# Patient Record
Sex: FEMALE | Race: WHITE | NOT HISPANIC OR LATINO | URBAN - METROPOLITAN AREA
[De-identification: names, ages, dates, MRNs, and addresses within clinical notes are randomized per-mention and may not be internally consistent; named-entity substitution may affect disease eponyms.]

---

## 2020-08-20 ENCOUNTER — COMMUNICATION - HEALTHEAST (OUTPATIENT)
Dept: SCHEDULING | Facility: CLINIC | Age: 65
End: 2020-08-20

## 2020-10-19 ENCOUNTER — OFFICE VISIT - HEALTHEAST (OUTPATIENT)
Dept: FAMILY MEDICINE | Facility: CLINIC | Age: 65
End: 2020-10-19

## 2020-10-19 ENCOUNTER — COMMUNICATION - HEALTHEAST (OUTPATIENT)
Dept: FAMILY MEDICINE | Facility: CLINIC | Age: 65
End: 2020-10-19

## 2020-10-19 ENCOUNTER — AMBULATORY - HEALTHEAST (OUTPATIENT)
Dept: LAB | Facility: CLINIC | Age: 65
End: 2020-10-19

## 2020-10-19 ENCOUNTER — COMMUNICATION - HEALTHEAST (OUTPATIENT)
Dept: SCHEDULING | Facility: CLINIC | Age: 65
End: 2020-10-19

## 2020-10-19 DIAGNOSIS — E11.9 TYPE 2 DIABETES MELLITUS WITHOUT COMPLICATION, WITHOUT LONG-TERM CURRENT USE OF INSULIN (H): ICD-10-CM

## 2020-10-19 DIAGNOSIS — E03.9 ACQUIRED HYPOTHYROIDISM: ICD-10-CM

## 2020-10-19 DIAGNOSIS — Z86.16 HISTORY OF 2019 NOVEL CORONAVIRUS DISEASE (COVID-19): ICD-10-CM

## 2020-10-19 DIAGNOSIS — E78.5 HYPERLIPIDEMIA, UNSPECIFIED HYPERLIPIDEMIA TYPE: ICD-10-CM

## 2020-10-19 DIAGNOSIS — F41.1 GAD (GENERALIZED ANXIETY DISORDER): ICD-10-CM

## 2020-10-19 DIAGNOSIS — D68.51 FACTOR V LEIDEN MUTATION (H): ICD-10-CM

## 2020-10-19 LAB
ALBUMIN SERPL-MCNC: 4.5 G/DL (ref 3.5–5)
ALP SERPL-CCNC: 69 U/L (ref 45–120)
ALT SERPL W P-5'-P-CCNC: 32 U/L (ref 0–45)
ANION GAP SERPL CALCULATED.3IONS-SCNC: 13 MMOL/L (ref 5–18)
AST SERPL W P-5'-P-CCNC: 23 U/L (ref 0–40)
BILIRUB SERPL-MCNC: 0.9 MG/DL (ref 0–1)
BUN SERPL-MCNC: 17 MG/DL (ref 8–22)
CALCIUM SERPL-MCNC: 10.1 MG/DL (ref 8.5–10.5)
CHLORIDE BLD-SCNC: 103 MMOL/L (ref 98–107)
CHOLEST SERPL-MCNC: 183 MG/DL
CO2 SERPL-SCNC: 25 MMOL/L (ref 22–31)
CREAT SERPL-MCNC: 0.78 MG/DL (ref 0.6–1.1)
CREAT UR-MCNC: 94 MG/DL
FASTING STATUS PATIENT QL REPORTED: YES
GFR SERPL CREATININE-BSD FRML MDRD: >60 ML/MIN/1.73M2
GLUCOSE BLD-MCNC: 121 MG/DL (ref 70–125)
HBA1C MFR BLD: 5.8 %
HDLC SERPL-MCNC: 40 MG/DL
LDLC SERPL CALC-MCNC: 108 MG/DL
MICROALBUMIN UR-MCNC: 0.64 MG/DL (ref 0–1.99)
MICROALBUMIN/CREAT UR: 6.8 MG/G
POTASSIUM BLD-SCNC: 5.6 MMOL/L (ref 3.5–5)
PROT SERPL-MCNC: 7.3 G/DL (ref 6–8)
SODIUM SERPL-SCNC: 141 MMOL/L (ref 136–145)
T4 FREE SERPL-MCNC: 1.3 NG/DL (ref 0.7–1.8)
TRIGL SERPL-MCNC: 176 MG/DL
TSH SERPL DL<=0.005 MIU/L-ACNC: 0.33 UIU/ML (ref 0.3–5)

## 2020-10-19 RX ORDER — ASPIRIN 81 MG/1
81 TABLET, CHEWABLE ORAL
Status: SHIPPED | COMMUNITY
Start: 2020-10-19

## 2020-10-19 RX ORDER — TACROLIMUS 5 MG/1
CAPSULE ORAL
Status: SHIPPED | COMMUNITY
Start: 2019-06-11

## 2020-10-19 RX ORDER — CITALOPRAM HYDROBROMIDE 20 MG/1
20 TABLET ORAL DAILY
Qty: 90 TABLET | Refills: 3 | Status: SHIPPED | OUTPATIENT
Start: 2020-10-19 | End: 2021-12-13

## 2020-10-19 RX ORDER — AMOXICILLIN 500 MG/1
CAPSULE ORAL
Status: SHIPPED | COMMUNITY
Start: 2020-01-27

## 2020-10-19 RX ORDER — ROSUVASTATIN CALCIUM 20 MG/1
20 TABLET, COATED ORAL DAILY
Qty: 90 TABLET | Refills: 3 | Status: SHIPPED | OUTPATIENT
Start: 2020-10-19

## 2020-10-19 RX ORDER — FLASH GLUCOSE SENSOR
1 KIT MISCELLANEOUS
Qty: 6 KIT | Refills: 3 | Status: SHIPPED | OUTPATIENT
Start: 2020-10-19 | End: 2021-08-28

## 2020-10-19 RX ORDER — BLOOD SUGAR DIAGNOSTIC
STRIP MISCELLANEOUS
Status: SHIPPED | COMMUNITY
Start: 2020-04-01

## 2020-10-19 RX ORDER — KETOCONAZOLE 20 MG/G
CREAM TOPICAL
Status: SHIPPED | COMMUNITY
Start: 2019-06-10

## 2020-10-19 RX ORDER — TOPIRAMATE 25 MG/1
CAPSULE, EXTENDED RELEASE ORAL
Status: SHIPPED | COMMUNITY
Start: 2020-05-21

## 2020-10-19 RX ORDER — LISINOPRIL 10 MG/1
10 TABLET ORAL DAILY
Status: SHIPPED | COMMUNITY
Start: 2020-09-21

## 2020-10-19 RX ORDER — AZELAIC ACID 0.15 G/G
GEL TOPICAL
Status: SHIPPED | COMMUNITY
Start: 2019-06-10

## 2020-10-19 RX ORDER — PHENTERMINE HYDROCHLORIDE 37.5 MG/1
37.5 TABLET ORAL
Status: SHIPPED | COMMUNITY
Start: 2020-10-19

## 2020-10-19 RX ORDER — LEVOTHYROXINE SODIUM 88 MCG
88 TABLET ORAL
Qty: 90 TABLET | Refills: 3 | Status: SHIPPED | OUTPATIENT
Start: 2020-10-19

## 2020-10-19 ASSESSMENT — ANXIETY QUESTIONNAIRES
IF YOU CHECKED OFF ANY PROBLEMS ON THIS QUESTIONNAIRE, HOW DIFFICULT HAVE THESE PROBLEMS MADE IT FOR YOU TO DO YOUR WORK, TAKE CARE OF THINGS AT HOME, OR GET ALONG WITH OTHER PEOPLE: SOMEWHAT DIFFICULT
1. FEELING NERVOUS, ANXIOUS, OR ON EDGE: SEVERAL DAYS
GAD7 TOTAL SCORE: 8
7. FEELING AFRAID AS IF SOMETHING AWFUL MIGHT HAPPEN: SEVERAL DAYS
4. TROUBLE RELAXING: SEVERAL DAYS
2. NOT BEING ABLE TO STOP OR CONTROL WORRYING: SEVERAL DAYS
5. BEING SO RESTLESS THAT IT IS HARD TO SIT STILL: SEVERAL DAYS
3. WORRYING TOO MUCH ABOUT DIFFERENT THINGS: SEVERAL DAYS
6. BECOMING EASILY ANNOYED OR IRRITABLE: MORE THAN HALF THE DAYS

## 2020-10-19 ASSESSMENT — PATIENT HEALTH QUESTIONNAIRE - PHQ9: SUM OF ALL RESPONSES TO PHQ QUESTIONS 1-9: 4

## 2020-10-21 ENCOUNTER — COMMUNICATION - HEALTHEAST (OUTPATIENT)
Dept: FAMILY MEDICINE | Facility: CLINIC | Age: 65
End: 2020-10-21

## 2020-10-23 ENCOUNTER — COMMUNICATION - HEALTHEAST (OUTPATIENT)
Dept: FAMILY MEDICINE | Facility: CLINIC | Age: 65
End: 2020-10-23

## 2020-10-23 DIAGNOSIS — E87.5 HYPERKALEMIA: ICD-10-CM

## 2020-10-23 DIAGNOSIS — E11.9 TYPE 2 DIABETES MELLITUS WITHOUT COMPLICATION, WITHOUT LONG-TERM CURRENT USE OF INSULIN (H): ICD-10-CM

## 2020-10-23 RX ORDER — METFORMIN HCL 500 MG
500 TABLET, EXTENDED RELEASE 24 HR ORAL
Qty: 90 TABLET | Refills: 3 | Status: SHIPPED | OUTPATIENT
Start: 2020-10-23 | End: 2021-11-10

## 2020-10-29 ENCOUNTER — AMBULATORY - HEALTHEAST (OUTPATIENT)
Dept: LAB | Facility: CLINIC | Age: 65
End: 2020-10-29

## 2020-10-29 DIAGNOSIS — E87.5 HYPERKALEMIA: ICD-10-CM

## 2020-10-29 LAB — POTASSIUM BLD-SCNC: 4.9 MMOL/L (ref 3.5–5)

## 2021-05-27 ASSESSMENT — PATIENT HEALTH QUESTIONNAIRE - PHQ9: SUM OF ALL RESPONSES TO PHQ QUESTIONS 1-9: 4

## 2021-05-28 ASSESSMENT — ANXIETY QUESTIONNAIRES: GAD7 TOTAL SCORE: 8

## 2021-06-10 NOTE — TELEPHONE ENCOUNTER
Coronavirus (COVID-19) Notification    Reason for call  Notify of POSITIVE  COVID-19 lab result, assess symptoms,  review [facility] recommendations    Lab Result   Lab test for 2019-nCoV rRt-PCR or SARS-COV-2 PCR  Oropharyngeal AND/OR nasopharyngeal swabs were POSITIVE for 2019-nCoV RNA [OR] SARS-COV-2 RNA (COVID-19) RNA     We have been unable to reach Patient by phone at this time to notify of their Positive COVID-19 result.  Left voicemail message requesting a call back to [phone] [facility] for results.        POSITIVE COVID-19 Letter sent.    [Name]  Clive Hahn RN  [employer] [facility]  COVID19 Results Team RN  Ph# [phone]         week(s)/2 weeks

## 2021-06-12 NOTE — TELEPHONE ENCOUNTER
Central PA team  [phone]  Pool: HE PA MED ([id number])          PA has been initiated.       PA form completed and faxed insurance via Cover My Meds     Key:  [id number]     Medication:  FreeStyle Neetu 14 Day Sensor    Insurance:  [employer]        Response will be received via fax and may take up to 5-10 business days depending on plan

## 2021-06-12 NOTE — PROGRESS NOTES
"Veronica Dumont is a 65 y.o. female who is being evaluated via a billable video visit.      The patient has been notified of following:     \"This video visit will be conducted via a call between you and your physician/provider. We have found that certain health care needs can be provided without the need for an in-person physical exam.  This service lets us provide the care you need with a video conversation.  If a prescription is necessary we can send it directly to your pharmacy.  If lab work is needed we can place an order for that and you can then stop by our lab to have the test done at a later time.    Video visits are billed at different rates depending on your insurance coverage. Please reach out to your insurance provider with any questions.    If during the course of the call the physician/provider feels a video visit is not appropriate, you will not be charged for this service.\"    Patient has given verbal consent to a Video visit? Yes  How would you like to obtain your AVS? AVS Preference: MyChart.  If dropped by the video visit, the video invitation should be sent to: Other e-mail: MY CHART   Will anyone else be joining your video visit? No        Video Start Time: 7:35    Additional provider notes:   ASSESSMENT/PLAN:       1. Type 2 diabetes mellitus without complication, without long-term current use of insulin (H)    - Glycosylated Hemoglobin A1c; Future, A1c 5.8  May be able to reduce her Metformin dose to 500 mg daily and if we do that it would be either best to take the medicine first thing in the morning with breakfast or to switch it to the extended release Metformin 500 mg daily    - Comprehensive Metabolic Panel; Future, blood sugar was 121 and potassium was 5.6  Elevated potassium could be related to lisinopril or also some hemolysis on the sample but that was not mentioned in the report.  May want to consider repeating a potassium level  Foods that are high in potassium would include " bananas, citrus fruits, apricots and other dried fruits, spinach, sweet potatoes and may want to limit these foods.    - Microalbumin, Random Urine; Future, microalbumin to creatinine ratio normal at 6.8    - FREESTYLE CLARA 14 DAY SENSOR Kit; Apply 1 Units topically every 14 (fourteen) days. Use as directed change sensor every 14 days  Dispense: 6 kit; Refill: 3  Part D of Medicare declined pain for the clara test kits and will see if it will be approved through part B of Medicare and then may need to send it to the [facility] mail out pharmacy would be more likely to be able to submit the charges to part B of Medicare.    2. Hyperlipidemia, unspecified hyperlipidemia type    - Lipid [place] FASTING; Future, 183/176/40/180    - rosuvastatin (CRESTOR) 20 MG tablet; Take 1 tablet (20 mg total) by mouth daily.  Dispense: 90 tablet; Refill: 3    - SYNTHROID 88 mcg tablet; Take 1 tablet (88 mcg total) by mouth daily before breakfast.  Dispense: 90 tablet; Refill: 3    3. Acquired hypothyroidism    - T4, Free; Future, 1.3 which is normal    - Thyroid Stimulating Hormone (TSH); Future, 0.33 which is in the normal range    Patient will continue on Synthroid 88 mcg daily    4. LINDA (generalized anxiety disorder)    - citalopram (CELEXA) 20 MG tablet; Take 1 tablet (20 mg total) by mouth daily.  Dispense: 90 tablet; Refill: 3  The patient feels that her citalopram is helping her symptoms and that she is not having any side effects and desires to continue on the same dose.    5. Factor V Leiden mutation (H)  This could be an issue in the future particularly if at increased risk for venous stasis and thrombosis.  Precautions important.    6. History of 2019 novel coronavirus disease (COVID-19)  Patient seems to be completely recovered and her  currently is home and he had a much more severe illness with hospitalization and still requiring oxygen.    MyChart message with test results  Would be good in the near future to  have a face-to-face visit so that her blood pressure could be checked.  According to the patient her home blood pressures have been running in the 130s over 70s.               Asaf Saldaña MD      PROGRESS NOTE   10/22/2020    SUBJECTIVE:  Veronica Dumont is a 65 y.o. female  who presents for   Chief Complaint   Patient presents with     Establish Care     Medication Refill     Diabetes     New patient seen today per video visit for med check.  Patient typically gets her care in [place] but has been here in [place] for an extended period of time because of her 's kidney transplant.  Family also has a home in [facility].    1. Type 2 diabetes mellitus without complication, without long-term current use of insulin (H)  The patient has a diagnosis of type 2 diabetes and has been treated with Metformin 500 mg twice a day.  The patient's last A1c was greater than a year ago and was 6.7.  The patient has noticed some low blood sugars.  So she does not have to poke her finger so much she would like to get the deneen continuous blood glucose monitoring renewed so she can have additional kits to use.  Each kit the last 14 days.    2. Hyperlipidemia, unspecified hyperlipidemia type  The patient is on rosuvastatin 20 mg daily for hyperlipidemia    3. Acquired hypothyroidism  The patient is on levothyroxine 88 mcg daily and is in need of having her thyroid function checked    4. LINDA (generalized anxiety disorder)  Patient has had a history of anxiety particularly bothersome with her current situation both with her 's health problems and her recently having COVID-19.  Also being away from there home in [place] has been challenging.    5. Factor V Leiden mutation (H)  This condition was noted in her medical history but she has not had to be on any blood thinners.    6. History of 2019 novel coronavirus disease (COVID-19)  Positive diagnosis August 2020 with a relatively mild case not requiring  hospitalization.  Patient feels that she has completely recovered from this illness.    Patient Active Problem List   Diagnosis     Type 2 diabetes mellitus without complication, without long-term current use of insulin (H)     Hyperlipidemia, unspecified hyperlipidemia type     Acquired hypothyroidism     LINDA (generalized anxiety disorder)     Factor V Leiden mutation (H)     History of 2019 novel coronavirus disease (COVID-19)     History of cervical dysplasia       Current Outpatient Medications   Medication Sig Dispense Refill     ACCU-CHEK SANGITA PLUS TEST STRP strips USE TO TEST BLOOD SUGAR ONCE DAILY       amoxicillin (AMOXIL) 500 MG capsule TAKE 4 CAPSUELS BY MOUTH 1 HOUR BEFORE DENTAL APPOINMENT       aspirin 81 mg chewable tablet Chew 81 mg.       azelaic acid (FINACEA) 15 % gel Apply topically.       citalopram (CELEXA) 20 MG tablet Take 1 tablet (20 mg total) by mouth daily. 90 tablet 3     FREESTYLE CLARA 14 DAY SENSOR Kit Apply 1 Units topically every 14 (fourteen) days. Use as directed change sensor every 14 days 6 kit 3     ketoconazole (NIZORAL) 2 % cream Apply topically.       lisinopriL (PRINIVIL,ZESTRIL) 10 MG tablet Take 10 mg by mouth daily.       metFORMIN (GLUCOPHAGE) 500 MG tablet TAKE 1 TABLET DAILY WITH A MEAL X 1 WEEK, THEN 1 TAB IN THE MORNING & 1 IN THE EVENING WITH A MEAL       phentermine (ADIPEX-P) 37.5 mg tablet Take 37.5 mg by mouth daily before breakfast.       rosuvastatin (CRESTOR) 20 MG tablet Take 1 tablet (20 mg total) by mouth daily. 90 tablet 3     sulfacetamide sodium-sulfur 10-5 % (w/w) Clsr Apply to face one to two times daily       SYNTHROID 88 mcg tablet Take 1 tablet (88 mcg total) by mouth daily before breakfast. 90 tablet 3     tacrolimus (PROGRAF) 5 MG capsule Apply topically.       topiramate (QUDEXY XR) 25 mg 24 hour capsule TAKE 1 CAPSULE BY MOUTH ONCE DAILY AT BEDTIME       No current facility-administered medications for this visit.        Social History      Tobacco Use   Smoking Status Former Smoker   Smokeless Tobacco Never Used   Tobacco Comment    QUIT 35 YEARS AGO            OBJECTIVE:        Recent Results (from the past 240 hour(s))   Glycosylated Hemoglobin A1c   Result Value Ref Range    Hemoglobin A1c 5.8 (H) <=5.6 %   Lipid [place] FASTING   Result Value Ref Range    Cholesterol 183 <=199 mg/dL    Triglycerides 176 (H) <=149 mg/dL    HDL Cholesterol 40 (L) >=50 mg/dL    LDL Calculated 108 <=129 mg/dL    Patient Fasting > 8hrs? Yes    Comprehensive Metabolic Panel   Result Value Ref Range    Sodium 141 136 - 145 mmol/L    Potassium 5.6 (H) 3.5 - 5.0 mmol/L    Chloride 103 98 - 107 mmol/L    CO2 25 22 - 31 mmol/L    Anion Gap, Calculation 13 5 - 18 mmol/L    Glucose 121 70 - 125 mg/dL    BUN 17 8 - 22 mg/dL    Creatinine 0.78 0.60 - 1.10 mg/dL    GFR MDRD Af Amer >60 >60 mL/min/1.73m2    GFR MDRD Non Af Amer >60 >60 mL/min/1.73m2    Bilirubin, Total 0.9 0.0 - 1.0 mg/dL    Calcium 10.1 8.5 - 10.5 mg/dL    Protein, Total 7.3 6.0 - 8.0 g/dL    Albumin 4.5 3.5 - 5.0 g/dL    Alkaline Phosphatase 69 45 - 120 U/L    AST 23 0 - 40 U/L    ALT 32 0 - 45 U/L   T4, Free   Result Value Ref Range    Free T4 1.3 0.7 - 1.8 ng/dL   Thyroid Stimulating Hormone (TSH)   Result Value Ref Range    TSH 0.33 0.30 - 5.00 uIU/mL   Microalbumin, Random Urine   Result Value Ref Range    Microalbumin, Random Urine 0.64 0.00 - 1.99 mg/dL    Creatinine, Urine 94.0 mg/dL    Microalbumin/Creatinine Ratio Random Urine 6.8 <=19.9 mg/g       There were no vitals filed for this visit.  No data recorded        Physical Exam:    GENERAL: Healthy appearing 65-year-old female, alert and no distress  EYES: Eyes grossly normal to inspection. No discharge or erythema, or obvious scleral/conjunctival abnormalities.  RESP: No audible wheeze, cough, or visible cyanosis.  No visible retractions or increased work of breathing.    NEURO: Cranial nerves grossly intact. Mentation and speech appropriate for  age.  PSYCH: Mentation appears normal, affect normal/bright, judgement and insight intact, normal speech and appearance well-groomed      Video-Visit Details    Type of service:  Video Visit    Video End Time (time video stopped): 8:04  Originating Location (pt. Location): Home    Distant Location (provider location):  [facility]     Platform used for Video Visit: Jorge Saldaña MD

## 2021-06-12 NOTE — PROGRESS NOTES
Veronica,  Your potassium has improved and now is normal at 4.9.  So that is good and no worries. Keep up with current medications.  Please let me know if questions.    Dr. Saldaña

## 2021-06-12 NOTE — PROGRESS NOTES
Veronica,  It was a pleasure to visit with you this week with the video visit.  Your test results are back and I would like to discuss those results.  The urine test looks at your kidney function and your numbers look normal.  We check this on a yearly basis because of your diabetes.    The lipid cascade shows that your triglycerides are mildly elevated at 176 and would like to see below 150.  The HDL cholesterol is the good cholesterol and that should be in the 50s.  The LDL is your bad cholesterol and ideally would like to see that below 100.  If you were not taking the rosuvastatin these values would be worse.  We certainly need to continue on this medication and to lower the triglycerides try to limit carbohydrates, baked goods and sweets.    The metabolic panel showed that your blood glucose was 121.  The potassium was high at 5.6.  Taking lisinopril can raise the potassium but not usually that much.  Foods that raise potassium would include bananas, citrus fruit, dried fruits such as apricots, raisins and dates.  Also spinach, and sweet potatoes are high in potassium.  If you are taking any type of potassium supplement I certainly would stop that.I would try to not eat a regular diet of the above foods but do not need to be eliminated completely. It would be good to recheck your potassium at some point in the next few weeks.  Your liver and kidney function tests are normal.    The thyroid tests included the free T4 and TSH which are both in the normal range.  You should continue your current strength of 88 mcg on the thyroid replacement.    The hemoglobin A1c was 5.8 which is nice and low.  I think you mentioned that your blood sugars seem to be running a bit low.  We may be able to decrease your Metformin to 500 mg daily.  The current Metformin that you are on is the 12-hour Metformin and there is an extended release Metformin that works for 24 hours that may be worthwhile to switch to if taking the Metformin  just once a day.    I had sent another [employer] message yesterday about the deneen continuous blood sugar monitoring device.  Our prior authorization team did get back to me today and stated that the [facility] does not have a way to process the needed billing through Medicare part B.  If you would like I would be more than happy to send the prescription to the [facility] mail out pharmacy and see if they would be able to help get approval for the deneen system to monitor your blood sugars.    Please respond back to me on feedback and questions that you might have about my message.    Best regards,  Dr. Saldaña

## 2021-06-12 NOTE — TELEPHONE ENCOUNTER
Prior Authorization Request  Who s requesting:  Pharmacy  Pharmacy Name and Location: [facility] #[id number]  Medication Name: FREESTYLE CLARA 14 DAY SENSOR Kit  Insurance Plan: [facility] [employer]    Insurance Member ID Number:  [id number]  CoverMyMeds Key: N/A  Informed patient that prior authorizations can take up to 10 business days for response:   NA  Okay to leave a detailed message: No

## 2021-06-12 NOTE — TELEPHONE ENCOUNTER
I called [facility] and they have not been able to successfully get the FreeStyle Neetu's to process through Medicare Part B. The recommendation of sending them to the [facility] would be the best option as they have a team that would work on the request through the patient's Medicare Medical Policy.

## 2021-06-16 PROBLEM — E11.9 TYPE 2 DIABETES MELLITUS WITHOUT COMPLICATION, WITHOUT LONG-TERM CURRENT USE OF INSULIN (H): Status: ACTIVE | Noted: 2020-10-22

## 2021-06-16 PROBLEM — E78.5 HYPERLIPIDEMIA, UNSPECIFIED HYPERLIPIDEMIA TYPE: Status: ACTIVE | Noted: 2020-10-22

## 2021-06-16 PROBLEM — D68.51 FACTOR V LEIDEN MUTATION (H): Status: ACTIVE | Noted: 2020-10-22

## 2021-06-16 PROBLEM — Z86.16 HISTORY OF 2019 NOVEL CORONAVIRUS DISEASE (COVID-19): Status: ACTIVE | Noted: 2020-10-22

## 2021-06-16 PROBLEM — E03.9 ACQUIRED HYPOTHYROIDISM: Status: ACTIVE | Noted: 2020-10-22

## 2021-06-16 PROBLEM — Z87.410 HISTORY OF CERVICAL DYSPLASIA: Status: ACTIVE | Noted: 2020-10-22

## 2021-06-16 PROBLEM — F41.1 GAD (GENERALIZED ANXIETY DISORDER): Status: ACTIVE | Noted: 2020-10-22

## 2021-06-17 NOTE — TELEPHONE ENCOUNTER
Telephone Encounter by Stella Siddiqi at 10/21/2020  3:36 PM     Author: Stella Siddiqi Service: -- Author Type: --    Filed: 10/21/2020  3:37 PM Encounter Date: 10/21/2020 Status: Signed    : Stella Siddiqi       PRIOR AUTHORIZATION DENIED    Denial Rational: Medication is not covered under Medicare Part D

## 2021-06-26 ENCOUNTER — HEALTH MAINTENANCE LETTER (OUTPATIENT)
Age: 66
End: 2021-06-26

## 2021-10-16 ENCOUNTER — HEALTH MAINTENANCE LETTER (OUTPATIENT)
Age: 66
End: 2021-10-16

## 2022-02-05 ENCOUNTER — HEALTH MAINTENANCE LETTER (OUTPATIENT)
Age: 67
End: 2022-02-05

## 2022-03-07 DIAGNOSIS — F41.1 GAD (GENERALIZED ANXIETY DISORDER): ICD-10-CM

## 2022-03-08 RX ORDER — CITALOPRAM HYDROBROMIDE 20 MG/1
TABLET ORAL
Qty: 90 TABLET | Refills: 0 | OUTPATIENT
Start: 2022-03-08

## 2022-03-19 DIAGNOSIS — F41.1 GAD (GENERALIZED ANXIETY DISORDER): ICD-10-CM

## 2022-03-21 RX ORDER — CITALOPRAM HYDROBROMIDE 20 MG/1
TABLET ORAL
Qty: 90 TABLET | Refills: 0 | Status: SHIPPED | OUTPATIENT
Start: 2022-03-21

## 2022-03-21 NOTE — TELEPHONE ENCOUNTER
"Routing refill request to provider for review/approval because:  Patient needs to be seen because it has been more than 1 year since last office visit. NO upcoming appointments scheduled     Former patient of ??? & has not established care with another provider.  Please assign refill request to covering provider per Clinic standard process.    Last Written Prescription Date:  12/13/21  Last Fill Quantity: 90,  # refills: 0   Last office visit provider:  10/19/2020 - [facility]     Requested Prescriptions   Pending Prescriptions Disp Refills     citalopram (CELEXA) 20 MG tablet [Pharmacy Med Name: CITALOPRAM HBR 20 MG TABLET] 90 tablet 0     Sig: TAKE 1 TABLET BY MOUTH EVERY DAY       SSRIs Protocol Failed - 3/19/2022  4:28 PM        Failed - Recent (12 mo) or future (30 days) visit within the authorizing provider's specialty     Patient has had an office visit with the authorizing provider or a provider within the authorizing providers department within the previous 12 mos or has a future within next 30 days. See \"Patient Info\" tab in inbasket, or \"Choose Columns\" in Meds & Orders section of the refill encounter.              Passed - Medication is active on med list        Passed - Patient is age 18 or older        Passed - No active pregnancy on record        Passed - No positive pregnancy test in last 12 months             Flower Carlson RN 03/21/22 1:02 PM  "

## 2022-03-22 DIAGNOSIS — E11.9 TYPE 2 DIABETES MELLITUS WITHOUT COMPLICATION, WITHOUT LONG-TERM CURRENT USE OF INSULIN (H): ICD-10-CM

## 2022-03-23 RX ORDER — FLASH GLUCOSE SENSOR
KIT MISCELLANEOUS
Qty: 12 EACH | Refills: 1 | Status: SHIPPED | OUTPATIENT
Start: 2022-03-23

## 2022-03-23 NOTE — TELEPHONE ENCOUNTER
Routing refill request to provider for review/approval because:  Drug not on the [facility] refill protocol   Patient needs to be seen because it has been more than 1 year since last office visit.    ast Written Prescription Date:  8/28/2021  Last Fill Quantity: 2,  # refills: 4   Last office visit provider:  10/19/2020     Requested Prescriptions   Pending Prescriptions Disp Refills     Continuous Blood Gluc Sensor (FREESTYLE CLARA 14 DAY SENSOR) MISC [Pharmacy Med Name: FREESTYLE CLARA 14 DAY SENSOR]  1     Sig: CHANGE SENSOR EVERY 14 DAYS       There is no refill protocol information for this order          Carisa Guzmán, RN 03/23/22 3:09 PM

## 2022-05-28 ENCOUNTER — HEALTH MAINTENANCE LETTER (OUTPATIENT)
Age: 67
End: 2022-05-28

## 2022-07-23 ENCOUNTER — HEALTH MAINTENANCE LETTER (OUTPATIENT)
Age: 67
End: 2022-07-23

## 2022-09-19 DIAGNOSIS — E11.9 TYPE 2 DIABETES MELLITUS WITHOUT COMPLICATION, WITHOUT LONG-TERM CURRENT USE OF INSULIN (H): ICD-10-CM

## 2022-09-19 RX ORDER — METFORMIN HCL 500 MG
TABLET, EXTENDED RELEASE 24 HR ORAL
Qty: 90 TABLET | Refills: 1 | Status: CANCELLED | OUTPATIENT
Start: 2022-09-19

## 2022-09-19 NOTE — TELEPHONE ENCOUNTER
Refill Request  Medication name: Pending Prescriptions:                       Disp   Refills    metFORMIN (GLUCOPHAGE XR) 500 MG 24 hr ta*90 tab*1          [facility]

## 2022-10-01 ENCOUNTER — HEALTH MAINTENANCE LETTER (OUTPATIENT)
Age: 67
End: 2022-10-01

## 2023-02-04 ENCOUNTER — HEALTH MAINTENANCE LETTER (OUTPATIENT)
Age: 68
End: 2023-02-04

## 2023-05-14 ENCOUNTER — HEALTH MAINTENANCE LETTER (OUTPATIENT)
Age: 68
End: 2023-05-14

## 2023-08-06 ENCOUNTER — HEALTH MAINTENANCE LETTER (OUTPATIENT)
Age: 68
End: 2023-08-06

## 2023-10-15 ENCOUNTER — HEALTH MAINTENANCE LETTER (OUTPATIENT)
Age: 68
End: 2023-10-15

## 2023-12-22 NOTE — TELEPHONE ENCOUNTER
Who is calling:  Patient  Reason for Call:  Patient is coming in for labs today/she is fasting/appt made today, just a FYI  Date of last appointment with primary care: NA  Okay to leave a detailed message: No      
22-Dec-2023 08:00

## 2024-03-03 ENCOUNTER — HEALTH MAINTENANCE LETTER (OUTPATIENT)
Age: 69
End: 2024-03-03
